# Patient Record
Sex: FEMALE | Race: WHITE | NOT HISPANIC OR LATINO | URBAN - METROPOLITAN AREA
[De-identification: names, ages, dates, MRNs, and addresses within clinical notes are randomized per-mention and may not be internally consistent; named-entity substitution may affect disease eponyms.]

---

## 2021-10-14 ENCOUNTER — OTHER- (OUTPATIENT)
Dept: URBAN - METROPOLITAN AREA CLINIC 10 | Facility: CLINIC | Age: 61
Setting detail: DERMATOLOGY
End: 2021-10-14

## 2021-10-14 DIAGNOSIS — Z08 ENCOUNTER FOR FOLLOW-UP EXAMINATION AFTER COMPLETED TREATMENT FOR MALIGNANT NEOPLASM: ICD-10-CM

## 2021-10-14 PROCEDURE — 11102 TANGNTL BX SKIN SINGLE LES: CPT

## 2021-10-14 PROCEDURE — 17110 DESTRUCT B9 LESION 1-14: CPT

## 2023-08-30 ENCOUNTER — APPOINTMENT (OUTPATIENT)
Dept: URBAN - METROPOLITAN AREA SURGERY 16 | Age: 63
Setting detail: DERMATOLOGY
End: 2023-08-31

## 2023-08-30 DIAGNOSIS — L82.1 OTHER SEBORRHEIC KERATOSIS: ICD-10-CM

## 2023-08-30 DIAGNOSIS — L82.0 INFLAMED SEBORRHEIC KERATOSIS: ICD-10-CM

## 2023-08-30 DIAGNOSIS — L73.8 OTHER SPECIFIED FOLLICULAR DISORDERS: ICD-10-CM

## 2023-08-30 PROCEDURE — OTHER LIQUID NITROGEN: OTHER

## 2023-08-30 PROCEDURE — OTHER COUNSELING: OTHER

## 2023-08-30 PROCEDURE — OTHER ADDITIONAL NOTES: OTHER

## 2023-08-30 PROCEDURE — OTHER MIPS QUALITY: OTHER

## 2023-08-30 PROCEDURE — 99213 OFFICE O/P EST LOW 20 MIN: CPT | Mod: 25

## 2023-08-30 PROCEDURE — 17110 DESTRUCT B9 LESION 1-14: CPT

## 2023-08-30 ASSESSMENT — LOCATION ZONE DERM
LOCATION ZONE: ARM
LOCATION ZONE: NECK
LOCATION ZONE: FACE

## 2023-08-30 ASSESSMENT — LOCATION DETAILED DESCRIPTION DERM
LOCATION DETAILED: LEFT INFERIOR MEDIAL FOREHEAD
LOCATION DETAILED: RIGHT PROXIMAL DORSAL FOREARM
LOCATION DETAILED: RIGHT INFERIOR ANTERIOR NECK
LOCATION DETAILED: LEFT SUPERIOR MEDIAL MALAR CHEEK
LOCATION DETAILED: RIGHT INFERIOR LATERAL NECK

## 2023-08-30 ASSESSMENT — LOCATION SIMPLE DESCRIPTION DERM
LOCATION SIMPLE: LEFT FOREHEAD
LOCATION SIMPLE: LEFT CHEEK
LOCATION SIMPLE: RIGHT FOREARM
LOCATION SIMPLE: RIGHT ANTERIOR NECK

## 2023-08-30 NOTE — PROCEDURE: ADDITIONAL NOTES
Detail Level: Simple
Render Risk Assessment In Note?: no
Additional Notes: Discussed cautery removal in the future.

## 2023-08-30 NOTE — PROCEDURE: LIQUID NITROGEN
Add 52 Modifier (Optional): no
Spray Paint Text: The liquid nitrogen was applied to the skin utilizing a spray paint frosting technique.
Medical Necessity Information: It is in your best interest to select a reason for this procedure from the list below. All of these items fulfill various CMS LCD requirements except the new and changing color options.
Consent: The patient's consent was obtained including but not limited to risks of crusting, scabbing, blistering, scarring, darker or lighter pigmentary change, recurrence, incomplete removal and infection.
Medical Necessity Clause: This procedure was medically necessary because the lesions that were treated were:
Show Topical Anesthesia Variable?: Yes
Post-Care Instructions: I reviewed with the patient in detail post-care instructions. Patient is to wear sunprotection, and avoid picking at any of the treated lesions. Pt may apply Vaseline to crusted or scabbing areas.
Number Of Freeze-Thaw Cycles: 2 freeze-thaw cycles
Detail Level: Detailed

## 2024-10-24 ENCOUNTER — APPOINTMENT (OUTPATIENT)
Dept: URBAN - METROPOLITAN AREA SURGERY 16 | Age: 64
Setting detail: DERMATOLOGY
End: 2024-10-24

## 2024-10-24 DIAGNOSIS — L72.0 EPIDERMAL CYST: ICD-10-CM

## 2024-10-24 DIAGNOSIS — L73.8 OTHER SPECIFIED FOLLICULAR DISORDERS: ICD-10-CM

## 2024-10-24 PROCEDURE — OTHER COUNSELING: OTHER

## 2024-10-24 PROCEDURE — OTHER DEFER: OTHER

## 2024-10-24 PROCEDURE — 99213 OFFICE O/P EST LOW 20 MIN: CPT

## 2024-10-24 ASSESSMENT — LOCATION SIMPLE DESCRIPTION DERM
LOCATION SIMPLE: RIGHT FOREHEAD
LOCATION SIMPLE: RIGHT UPPER BACK

## 2024-10-24 ASSESSMENT — LOCATION DETAILED DESCRIPTION DERM
LOCATION DETAILED: RIGHT SUPERIOR UPPER BACK
LOCATION DETAILED: RIGHT INFERIOR MEDIAL FOREHEAD

## 2024-10-24 ASSESSMENT — LOCATION ZONE DERM
LOCATION ZONE: FACE
LOCATION ZONE: TRUNK

## 2024-10-24 NOTE — PROCEDURE: DEFER
Detail Level: Detailed
Procedure To Be Performed At Next Visit: Excision
Introduction Text (Please End With A Colon): The following procedure was deferred:
83

## 2024-11-22 ENCOUNTER — APPOINTMENT (OUTPATIENT)
Dept: URBAN - METROPOLITAN AREA SURGERY 16 | Age: 64
Setting detail: DERMATOLOGY
End: 2024-11-22

## 2024-11-22 DIAGNOSIS — L72.0 EPIDERMAL CYST: ICD-10-CM

## 2024-11-22 PROCEDURE — OTHER EXCISION: OTHER

## 2024-11-22 PROCEDURE — OTHER MIPS QUALITY: OTHER

## 2024-11-22 ASSESSMENT — LOCATION DETAILED DESCRIPTION DERM: LOCATION DETAILED: RIGHT LATERAL TRAPEZIAL NECK

## 2024-11-22 ASSESSMENT — LOCATION SIMPLE DESCRIPTION DERM: LOCATION SIMPLE: POSTERIOR NECK

## 2024-11-22 ASSESSMENT — LOCATION ZONE DERM: LOCATION ZONE: NECK

## 2024-11-22 NOTE — PROCEDURE: EXCISION
